# Patient Record
Sex: FEMALE | Race: BLACK OR AFRICAN AMERICAN | NOT HISPANIC OR LATINO
[De-identification: names, ages, dates, MRNs, and addresses within clinical notes are randomized per-mention and may not be internally consistent; named-entity substitution may affect disease eponyms.]

---

## 2017-03-16 ENCOUNTER — APPOINTMENT (OUTPATIENT)
Dept: SPEECH THERAPY | Facility: CLINIC | Age: 82
End: 2017-03-16

## 2017-03-16 ENCOUNTER — OUTPATIENT (OUTPATIENT)
Dept: OUTPATIENT SERVICES | Facility: HOSPITAL | Age: 82
LOS: 1 days | Discharge: HOME | End: 2017-03-16

## 2017-03-20 DIAGNOSIS — H90.3 SENSORINEURAL HEARING LOSS, BILATERAL: ICD-10-CM

## 2017-07-24 ENCOUNTER — APPOINTMENT (OUTPATIENT)
Dept: PHARMACY | Facility: CLINIC | Age: 82
End: 2017-07-24

## 2018-06-13 ENCOUNTER — OUTPATIENT (OUTPATIENT)
Dept: OUTPATIENT SERVICES | Facility: HOSPITAL | Age: 83
LOS: 1 days | Discharge: HOME | End: 2018-06-13

## 2018-06-13 ENCOUNTER — APPOINTMENT (OUTPATIENT)
Dept: SPEECH THERAPY | Facility: CLINIC | Age: 83
End: 2018-06-13

## 2018-06-14 DIAGNOSIS — H90.3 SENSORINEURAL HEARING LOSS, BILATERAL: ICD-10-CM

## 2018-10-25 ENCOUNTER — APPOINTMENT (OUTPATIENT)
Dept: SPEECH THERAPY | Facility: CLINIC | Age: 83
End: 2018-10-25

## 2019-05-23 ENCOUNTER — APPOINTMENT (OUTPATIENT)
Dept: SPEECH THERAPY | Facility: CLINIC | Age: 84
End: 2019-05-23

## 2019-05-23 ENCOUNTER — OUTPATIENT (OUTPATIENT)
Dept: OUTPATIENT SERVICES | Facility: HOSPITAL | Age: 84
LOS: 1 days | Discharge: HOME | End: 2019-05-23

## 2019-06-04 DIAGNOSIS — H90.3 SENSORINEURAL HEARING LOSS, BILATERAL: ICD-10-CM

## 2019-10-07 ENCOUNTER — EMERGENCY (EMERGENCY)
Facility: HOSPITAL | Age: 84
LOS: 1 days | Discharge: HOME | End: 2019-10-07
Attending: EMERGENCY MEDICINE
Payer: MEDICARE

## 2019-10-07 VITALS
SYSTOLIC BLOOD PRESSURE: 198 MMHG | RESPIRATION RATE: 20 BRPM | OXYGEN SATURATION: 96 % | WEIGHT: 123.02 LBS | HEIGHT: 62 IN | DIASTOLIC BLOOD PRESSURE: 69 MMHG | TEMPERATURE: 98 F | HEART RATE: 60 BPM

## 2019-10-07 PROCEDURE — 99283 EMERGENCY DEPT VISIT LOW MDM: CPT

## 2019-10-07 RX ORDER — MOXIFLOXACIN HCL 0.5 %
1 DROPS OPHTHALMIC (EYE)
Refills: 0 | Status: DISCONTINUED | OUTPATIENT
Start: 2019-10-07 | End: 2019-10-19

## 2019-10-07 RX ADMIN — Medication 1 DROP(S): at 22:47

## 2019-10-07 NOTE — ED PROVIDER NOTE - NSFOLLOWUPINSTRUCTIONS_ED_ALL_ED_FT
Use the moxifloxicin eye drops to the right eye every 8 hours for 1 week.  use the erythromycin ointment to the right eye 3 times a day.  follow up with [facility] eye clinic in 2-3 days.    return for worse pain, fever or other emergent concerns.

## 2019-10-07 NOTE — ED PROVIDER NOTE - PSH
Excision right axillary mass    "benign"  Laser surgery od  secondary  glaucoma   Left breast biopsy    "benign"  Left Cataract excision     (Normal Spontaneous Vaginal Delivery)  x3  Right cataract excision

## 2019-10-07 NOTE — ED PROVIDER NOTE - NSFOLLOWUPCLINICS_GEN_ALL_ED_FT
[facility]  Ophthalmology  [address] [place] [address] [address]  [place] [address]  Phone: [phone]  Fax:   Follow Up Time: 1-3 Days

## 2019-10-07 NOTE — ED PROVIDER NOTE - RAPID ASSESSMENT
87y F HTN, glaucoma, CHF, anemia, HLD, migraines here with c/o R eye pain since Friday described as irritation, given tylenol with some relief. Woke up Sat AM with some periorbital swelling and tearing. /94 at that time. spoke to Dr Rincon (renal) who advised to increased Amlodipine 5mg to 10mg which helped BP a bit. Seen by eye doctor today who sent pt to ER. Note states corneal defect and thinning, concern for perforation. Hx of glaucoma & trabs OU. CRVO 2 years ago. Rec consider Narinder flap vs copious  antibiotic and drops. NO fever or chills. No HA. No NV.   BP meds: HCTZ, metoprolol 25mg, enalapril 25mg, hydralazine 50mg, lasix 20mg  Meds: omeprazole, simvastatin, calcitriol, vit D, butalbital-caffeine-tylprn   Opthal- Dr Henry Gonzales   Retinal specialist-Dr Peña Glass  PCP-Dr Ayla Lamb 87y F HTN, glaucoma, CHF, anemia, HLD, migraines here with c/o R eye pain since Friday described as irritation, given tylenol with some relief. Woke up Sat AM with some periorbital swelling and tearing. /94 at that time. spoke to Dr Rincon (renal) who advised to increased Amlodipine 5mg to 10mg which helped BP a bit. Seen by eye doctor today who sent pt to ER. Note states corneal defect and thinning, concern for perforation. Hx of glaucoma & trabs OU. CRVO 2 years ago. Rec consider Narinder flap vs copious  antibiotic and drops. NO fever or chills. No HA. No NV. Optho consulted at 2056, awaiting call back.   BP meds: HCTZ, metoprolol 25mg, enalapril 25mg, hydralazine 50mg, lasix 20mg  Meds: omeprazole, simvastatin, calcitriol, vit D, butalbital-caffeine-tylprn   Opthal- Dr Henry Gonzales   Retinal specialist-Dr Peña Glass  PCP-Dr Ayla Lamb

## 2019-10-07 NOTE — ED PROVIDER NOTE - PATIENT PORTAL LINK FT
You can access the FollowMyHealth Patient Portal offered by [facility] by registering at the following website: [email or web address]. By joining [email or web address]’s FollowMyHealth portal, you will also be able to view your health information using other applications (apps) compatible with our system.

## 2019-10-07 NOTE — ED PROVIDER NOTE - PMH
Anemia    Chronic Renal Insufficiency    FH: Hypercholesterolemia    Gastric Ulcer  last endoscopy  3 years ago  Glaucoma    HTN - Hypertension    Osteoarthritis    Vitiligo  first noted 1979 etiology unknown

## 2019-10-07 NOTE — ED PROVIDER NOTE - OBJECTIVE STATEMENT
Attendinyo female presents with 3 days of pain to the right eye.  at baseline pt is blind in the right eye.  pt seen by eye doctor today and sent to the ED by ophtho evaluation for possible corneal abrasion.  pt seen by ophtho seen here prior to my evaluation.  no fever.  no chill.  no injury that she can recall.

## 2019-10-07 NOTE — ED ADULT NURSE REASSESSMENT NOTE - NS ED NURSE REASSESS COMMENT FT1
First dose moxifloxacin eye drops administered to R. eye per MD Mayen from opthalmology at bedside. Plan of care is being explained currently to patient and family member.

## 2019-10-07 NOTE — ED PROVIDER NOTE - CLINICAL SUMMARY MEDICAL DECISION MAKING FREE TEXT BOX
right corneal abrasion seen by ophtho.  they recommend moxifloxicin ophthalmic drops and erythromycin ointment and follow up with eye clinic within the week

## 2019-10-07 NOTE — CONSULT NOTE ADULT - SUBJECTIVE AND OBJECTIVE BOX
[facility] Ophthalmology Consult Note    HPI:  88YO F with Hx of Glaucoma OU, CRVO OD, Wet AMD OU coming in with R eye pain since saturday, with some redness OD and tearing. Patient went into ophthalmologist office  (Replaced by [facility]) who was concerned about corneal thinning and perforation and was sent to the ED. Patient states her eye pain is better than saturday. Denies any trauma, was at a birthday party on saturday and noticed the R eye pain. Patient has been NLP OD for 2.5 years, and poor vision in OS due to AMD for a long time as per daughter. Patient denies any vision changes OS. OD chronic blindness.     PMH: HTN, Pleural Effusion, Ruptured Hernia, Migranes, CHF, Anemia, HLP  Meds: As per ED provider note.   POcHx (including surgeries/lasers/trauma): Trab OU, Laser surgery for glaucoma OU, no trauma, Hx of Intravitreal injections OU, CRVO OD, Wet AMD OU    Drops: Combigan OU, Lopressor OU, Travatan OU  FamHx: None  Social Hx: None  Allergies: NKDA    ROS:  General (neg), Vision (per HPI), Head and Neck (neg), Pulm (neg), CV (neg), GI (neg),  (neg), Musculoskeletal (neg), Skin/Integ (neg), Neuro (neg), Endocrine (neg), Heme (neg), All/Immuno (neg)    Mood and Affect Appropriate ( x ),  Oriented to Time, Place, and Person x 3 ( x )    Ophthalmology Exam    Visual acuity (sc): NLP, 20/100 OS  Pupils: PERRL OD, no view OS due to hazy cornea   Ttono: 33, 18  Extraocular movements (EOMs): Full OU, no pain, no diplopia  Confrontational Visual Field (CVF):  Unable OD, Full OS  Color Plates: Unable OD 12/12 OS    Slit Lamp Exam (SLE)  External:  mild lid swelling, excessive lacrimation  Lids/Lashes/Lacrimal Ducts: Flat OU    Sclera/Conjunctiva:  1+ Injection OD, flat bleb OD, Superior functioning bleb OS  Cornea: OD with mm x mm size epithelial defect, with some thinning centrally about 25%, and 30% temporally. Oliver Negative OS    Anterior Chamber: Unable OD, D+Q OS  Iris:  Flat OD  Lens:  PCIOL OU    Fundus Exam: dilated with 1% tropicamide and 2.5% phenylephrine  Approval obtained from primary team for dilation  Patient aware that pupils can remained dilated for at least 4-6 hours  Exam performed with 20D lens    B scan OD   no vitritis,   Vitreous: wnl OU  Disc, cup/disc: sharp and pink, 0.4 OU  Macula:  wnl OU  Vessels:  wnl OU  Periphery: wnl OU    Diagnostic Testing:    Assessment:      Plan:      Follow-Up:  Patient should follow up his/her ophthalmologist or in the [facility] Practice within 1 week of discharge  [address] [place], [place] [id number] [phone]    S/D/W Dr Ortiz (attending) [facility] Ophthalmology Consult Note    HPI:  86YO F with Hx of Glaucoma OU, CRVO OD, Wet AMD OU coming in with R eye pain since saturday, with some redness OD and tearing. Patient went into ophthalmologist office  ([facility]) who was concerned about corneal thinning and perforation and was sent to the ED. Patient states her eye pain is better than saturday. Denies any trauma, was at a birthday party on saturday and noticed the R eye pain. Patient has been NLP OD for 2.5 years, and poor vision in OS due to AMD for a long time as per daughter. Patient denies any vision changes OS. OD chronic blindness.     PMH: HTN, Pleural Effusion, Ruptured Hernia, Migranes, CHF, Anemia, HLP  Meds: As per ED provider note.   POcHx (including surgeries/lasers/trauma): Trab OU, Laser surgery for glaucoma OU, no trauma, Hx of Intravitreal injections OU, CRVO OD, Wet AMD OU    Drops: Combigan OU, Lopressor OU, Travatan OU  FamHx: None  Social Hx: None  Allergies: NKDA    ROS:  General (neg), Vision (per HPI), Head and Neck (neg), Pulm (neg), CV (neg), GI (neg),  (neg), Musculoskeletal (neg), Skin/Integ (neg), Neuro (neg), Endocrine (neg), Heme (neg), All/Immuno (neg)    Mood and Affect Appropriate ( x ),  Oriented to Time, Place, and Person x 3 ( x )    Ophthalmology Exam    Visual acuity (sc): NLP, 20/100 OS  Pupils: PERRL OD, no view OS due to hazy cornea   Ttono: 33, 18  Extraocular movements (EOMs): Full OU, no pain, no diplopia  Confrontational Visual Field (CVF):  Unable OD, Full OS  Color Plates: Unable OD 12/12 OS    Slit Lamp Exam (SLE)  External:  mild lid swelling, excessive lacrimation  Lids/Lashes/Lacrimal Ducts: Flat OU    Sclera/Conjunctiva:  1+ Injection OD, flat bleb OD, Superior functioning bleb OS  Cornea: OD with ~6mm x 4mm size epithelial defect, with some thinning centrally about 20%, and 30% temporally. Oliver Negative OS    Anterior Chamber: Unable OD, D+Q OS  Iris:  Flat OD  Lens:  PCIOL OU    Fundus Exam: dilated with 1% tropicamide and 2.5% phenylephrine  Approval obtained from primary team for dilation  Patient aware that pupils can remained dilated for at least 4-6 hours  Exam performed with 20D lens    B scan OD   no vitritis    Vitreous: wnl OS  Disc, cup/disc: sharp and pink, 0.3 OS  Macula:  wnl OS  Vessels:  wnl OS  Periphery: wnl OS    Diagnostic Testing:        Assessment:  86YO F w PMH Glaucoma OD, CRVO OD, Wet AMD OU coming with R eye pain, from corneal abrasion.       Plan:  - Vigamox q2hrs   - Eointment qhs      Follow-Up:  Patient should follow up his/her ophthalmologist or in the [facility] Practice tomorrow. Patient will try to make an appointment with her private ophthalmologist tomorrow, if unable, will follow up at [address] [place] [address].  [place] [address] [phone]    D/W  [facility] Ophthalmology Consult Note    HPI:  86YO F with Hx of Glaucoma OU, CRVO OD, Wet AMD OU coming in with R eye pain since saturday, with some redness OD and tearing. Patient went into ophthalmologist office  ([facility]) who was concerned about corneal thinning and perforation and was sent to the ED. Patient states her eye pain is better than saturday. Denies any trauma, was at a birthday party on saturday and noticed the R eye pain. Patient has been NLP OD for 2.5 years, and poor vision in OS due to AMD for a long time as per daughter. Patient denies any vision changes OS. OD chronic blindness.     PMH: HTN, Pleural Effusion, Ruptured Hernia, Migranes, CHF, Anemia, HLP  Meds: As per ED provider note.   POcHx (including surgeries/lasers/trauma): Trab OU, Laser surgery for glaucoma OU, no trauma, Hx of Intravitreal injections OU, CRVO OD, Wet AMD OU    Drops: Combigan OU, Lopressor OU, Travatan OU  FamHx: None  Social Hx: None  Allergies: NKDA    ROS:  General (neg), Vision (per HPI), Head and Neck (neg), Pulm (neg), CV (neg), GI (neg),  (neg), Musculoskeletal (neg), Skin/Integ (neg), Neuro (neg), Endocrine (neg), Heme (neg), All/Immuno (neg)    Mood and Affect Appropriate ( x ),  Oriented to Time, Place, and Person x 3 ( x )    Ophthalmology Exam    Visual acuity (sc): NLP, 20/100 OS  Pupils: PERRL OD, no view OS due to hazy cornea   Ttono: 33, 18  Extraocular movements (EOMs): Full OU, no pain, no diplopia  Confrontational Visual Field (CVF):  Unable OD, Full OS  Color Plates: Unable OD 12/12 OS    Slit Lamp Exam (SLE)  External:  mild lid swelling, excessive lacrimation  Lids/Lashes/Lacrimal Ducts: Flat OU    Sclera/Conjunctiva:  1+ Injection OD, flat bleb OD, Superior functioning bleb OS  Cornea: OD with ~6mm x 4mm size epithelial defect, with some thinning centrally about 20%, and 30% temporally. Oliver Negative. OS Clear    Anterior Chamber: Unable OD, D+Q OS  Iris:  Flat OD  Lens:  PCIOL OU    Fundus Exam: dilated with 1% tropicamide and 2.5% phenylephrine  Approval obtained from primary team for dilation  Patient aware that pupils can remained dilated for at least 4-6 hours  Exam performed with 20D lens    B scan OD   no vitritis    Vitreous: wnl OS  Disc, cup/disc: sharp and pink, 0.3 OS  Macula:  wnl OS  Vessels:  wnl OS  Periphery: wnl OS    Diagnostic Testing:        Assessment:  86YO F w PMH Glaucoma OD, CRVO OD, Wet AMD OU coming with R eye pain, from corneal abrasion.       Plan:  - Vigamox q2hrs   - Eointment qhs      Follow-Up:  Patient should follow up his/her ophthalmologist or in the [facility] tomorrow. Patient will try to make an appointment with her private ophthalmologist tomorrow, if unable, will follow up at [address] [place] [address].  [place] [address] [phone]    D/W  [facility] Ophthalmology Consult Note    HPI:  88YO F with Hx of Glaucoma OU, CRVO OD, Wet AMD OU coming in with R eye pain since saturday, with some redness OD and tearing. Patient went into ophthalmologist office  ([facility]) who was concerned about corneal thinning and perforation and was sent to the ED. Patient states her eye pain is better than saturday. Denies any trauma, was at a birthday party on saturday and noticed the R eye pain. Patient has been NLP OD for 2.5 years, and poor vision in OS due to AMD for a long time as per daughter. Patient denies any vision changes OS. OD chronic blindness.     PMH: HTN, Pleural Effusion, Ruptured Hernia, Migranes, CHF, Anemia, HLP  Meds: As per ED provider note.   POcHx (including surgeries/lasers/trauma): Trab OU, Laser surgery for glaucoma OU, no trauma, Hx of Intravitreal injections OU, CRVO OD, Wet AMD OU    Drops: Combigan OU, Lopressor OU, Travatan OU  FamHx: None  Social Hx: None  Allergies: NKDA    ROS:  General (neg), Vision (per HPI), Head and Neck (neg), Pulm (neg), CV (neg), GI (neg),  (neg), Musculoskeletal (neg), Skin/Integ (neg), Neuro (neg), Endocrine (neg), Heme (neg), All/Immuno (neg)    Mood and Affect Appropriate ( x ),  Oriented to Time, Place, and Person x 3 ( x )    Ophthalmology Exam    Visual acuity (sc): NLP, 20/100 OS  Pupils: PERRL OD, no view OS due to hazy cornea   Ttono: 33, 18  Extraocular movements (EOMs): Full OU, no pain, no diplopia  Confrontational Visual Field (CVF):  Unable OD, Full OS  Color Plates: Unable OD 12/12 OS    Slit Lamp Exam (SLE)  External:  mild lid swelling, excessive lacrimation  Lids/Lashes/Lacrimal Ducts: Flat OU    Sclera/Conjunctiva:  1+ Injection OD, flat bleb OD, Superior functioning bleb OS  Cornea: OD with ~6mm x 4mm size epithelial defect, with some thinning centrally about 20%, and 30% temporally. Oliver Negative. OS Clear    Anterior Chamber: Unable OD, D+Q OS  Iris:  Flat OD  Lens:  PCIOL OU    Fundus Exam: dilated with 1% tropicamide and 2.5% phenylephrine  Approval obtained from primary team for dilation  Patient aware that pupils can remained dilated for at least 4-6 hours  Exam performed with 20D lens    B scan OD   no vitritis    Vitreous: wnl OS  Disc, cup/disc: 0.9 OS  Macula:  wnl OS  Vessels:  wnl OS  Periphery: wnl OS    Diagnostic Testing:        Assessment:  88YO F w PMH Glaucoma OD, CRVO OD, Wet AMD OU coming with R eye pain, from corneal abrasion.       Plan:  - Vigamox q2hrs   - Eointment qhs      Follow-Up:  Patient should follow up his/her ophthalmologist or in the [facility] Practice tomorrow. Patient will try to make an appointment with her private ophthalmologist tomorrow, if unable, will follow up at [address] [place] [address].  [place] [address] [phone]    D/W  [facility] Ophthalmology Consult Note    HPI:  88YO F with Hx of Glaucoma OU, CRVO OD, Wet AMD OU coming in with R eye pain since saturday, with some redness OD and tearing. Patient went into ophthalmologist office  ([facility]) who was concerned about corneal thinning and perforation and was sent to the ED. Patient states her eye pain is better than saturday. Denies any trauma, was at a birthday party on saturday and noticed the R eye pain. Patient has been NLP OD for 2.5 years, and poor vision in OS due to AMD for a long time as per daughter. Patient denies any vision changes OS. OD chronic blindness.     PMH: HTN, Pleural Effusion, Ruptured Hernia, Migranes, CHF, Anemia, HLP  Meds: As per ED provider note.   POcHx (including surgeries/lasers/trauma): Trab OU, Laser surgery for glaucoma OU, no trauma, Hx of Intravitreal injections OU, CRVO OD, Wet AMD OU    Drops: Combigan OU, Lopressor OU, Travatan OU  FamHx: None  Social Hx: None  Allergies: NKDA    ROS:  General (neg), Vision (per HPI), Head and Neck (neg), Pulm (neg), CV (neg), GI (neg),  (neg), Musculoskeletal (neg), Skin/Integ (neg), Neuro (neg), Endocrine (neg), Heme (neg), All/Immuno (neg)    Mood and Affect Appropriate ( x ),  Oriented to Time, Place, and Person x 3 ( x )    Ophthalmology Exam    Visual acuity (sc): NLP, 20/100 OS  Pupils: PERRL OD, no view OS due to hazy cornea   Ttono: 33, 18  Extraocular movements (EOMs): Full OU, no pain, no diplopia  Confrontational Visual Field (CVF):  Unable OD, Full OS  Color Plates: Unable OD 12/12 OS    Slit Lamp Exam (SLE)  External:  mild lid swelling, excessive lacrimation  Lids/Lashes/Lacrimal Ducts: Flat OU    Sclera/Conjunctiva:  1+ Injection OD, flat bleb OD, Superior functioning bleb OS  Cornea: OD with ~6mm x 4mm size epithelial defect, with some thinning centrally about 20%, and 30% temporally. Oliver Negative. OS Clear    Anterior Chamber: Unable OD, D+Q OS  Iris:  Flat OD  Lens:  PCIOL OU    Fundus Exam: dilated with 1% tropicamide and 2.5% phenylephrine  Approval obtained from primary team for dilation  Patient aware that pupils can remained dilated for at least 4-6 hours  Exam performed with 20D lens    B scan OD   no vitritis    Vitreous: wnl OS  Disc, cup/disc: 0.9 OS  Macula:  wnl OS  Vessels:  wnl OS  Periphery: wnl OS      Assessment:  88YO F w PMH Glaucoma OD, CRVO OD, Wet AMD OU coming with R eye pain, likely from corneal abrasion.       Plan:  - Vigamox q2hrs   - Eointment qhs  - c/w glaucoma drops  - education given to daughter and patient and questions answered      Follow-Up:  Patient should follow up his/her ophthalmologist or in the [facility] Practice tomorrow. Patient will try to make an appointment with her private ophthalmologist tomorrow, if unable, will follow up at [address] [place] [address].  [place] [address] [phone]    D/W

## 2019-10-08 ENCOUNTER — APPOINTMENT (OUTPATIENT)
Dept: OPHTHALMOLOGY | Facility: CLINIC | Age: 84
End: 2019-10-08

## 2019-10-08 VITALS
HEART RATE: 55 BPM | DIASTOLIC BLOOD PRESSURE: 75 MMHG | TEMPERATURE: 97 F | RESPIRATION RATE: 18 BRPM | OXYGEN SATURATION: 100 % | SYSTOLIC BLOOD PRESSURE: 180 MMHG

## 2019-10-08 PROCEDURE — 99284 EMERGENCY DEPT VISIT MOD MDM: CPT

## 2019-10-08 RX ORDER — MOXIFLOXACIN HCL 0.5 %
2 DROPS OPHTHALMIC (EYE) ONCE
Refills: 0 | Status: COMPLETED | OUTPATIENT
Start: 2019-10-08 | End: 2019-10-08

## 2019-10-08 RX ORDER — ERYTHROMYCIN BASE 5 MG/GRAM
1 OINTMENT (GRAM) OPHTHALMIC (EYE) ONCE
Refills: 0 | Status: COMPLETED | OUTPATIENT
Start: 2019-10-08 | End: 2019-10-08

## 2019-10-08 RX ADMIN — Medication 2 DROP(S): at 00:58

## 2019-10-08 RX ADMIN — Medication 1 APPLICATION(S): at 00:57

## 2019-10-08 NOTE — ED ADULT NURSE NOTE - OBJECTIVE STATEMENT
88 yo F 86 yo F amb 86 yo F ambulated to ED c/o red and imitated eye since friday, woke up saturday with periorbital swelling and pain.  Pt had HTN, and was advised by MD to increase medications. Pt was seen by eye doctor and advised to come to ED for evaluation.  Pt has right eye redness, tearing and swelling.  Pt has minimal visual acuity at baseline from 1 year ago as per daughter. Safety and comfort maintained.  will continue to monitor.

## 2021-06-23 ENCOUNTER — APPOINTMENT (OUTPATIENT)
Dept: SPEECH THERAPY | Facility: CLINIC | Age: 86
End: 2021-06-23

## 2021-06-23 ENCOUNTER — OUTPATIENT (OUTPATIENT)
Dept: OUTPATIENT SERVICES | Facility: HOSPITAL | Age: 86
LOS: 1 days | Discharge: HOME | End: 2021-06-23

## 2021-07-09 DIAGNOSIS — H90.3 SENSORINEURAL HEARING LOSS, BILATERAL: ICD-10-CM

## 2022-08-17 ENCOUNTER — OUTPATIENT (OUTPATIENT)
Dept: OUTPATIENT SERVICES | Facility: HOSPITAL | Age: 87
LOS: 1 days | Discharge: HOME | End: 2022-08-17

## 2022-08-17 ENCOUNTER — APPOINTMENT (OUTPATIENT)
Dept: SPEECH THERAPY | Facility: CLINIC | Age: 87
End: 2022-08-17

## 2022-08-22 DIAGNOSIS — H90.3 SENSORINEURAL HEARING LOSS, BILATERAL: ICD-10-CM

## 2022-08-31 ENCOUNTER — APPOINTMENT (OUTPATIENT)
Dept: SPEECH THERAPY | Facility: CLINIC | Age: 87
End: 2022-08-31

## 2022-09-01 ENCOUNTER — APPOINTMENT (OUTPATIENT)
Dept: SPEECH THERAPY | Facility: CLINIC | Age: 87
End: 2022-09-01

## 2022-11-21 ENCOUNTER — NON-APPOINTMENT (OUTPATIENT)
Age: 87
End: 2022-11-21

## 2023-04-19 ENCOUNTER — OFFICE (OUTPATIENT)
Dept: URBAN - METROPOLITAN AREA CLINIC 109 | Facility: CLINIC | Age: 88
Setting detail: OPHTHALMOLOGY
End: 2023-04-19
Payer: MEDICARE

## 2023-04-19 DIAGNOSIS — H40.1133: ICD-10-CM

## 2023-04-19 DIAGNOSIS — H18.11: ICD-10-CM

## 2023-04-19 PROCEDURE — 99213 OFFICE O/P EST LOW 20 MIN: CPT | Performed by: OPHTHALMOLOGY

## 2023-04-19 ASSESSMENT — SPHEQUIV_DERIVED
OD_SPHEQUIV: -2.375
OS_SPHEQUIV: -0.125
OS_SPHEQUIV: 3.125
OD_SPHEQUIV: 0.875

## 2023-04-19 ASSESSMENT — REFRACTION_MANIFEST
OS_AXIS: 068
OS_VA3: 20/300 (NI)
OD_AXIS: 048
OD_HPRISM_DIRECTION: PH
OS_AXIS: 068
OD_SPHERE: +1.75
OS_CYLINDER: -1.25
OD_AXIS: 048
OD_VA1: 20/50
OS_HPRISM_DIRECTION: PH
OS_SPHERE: +0.50
OS_SPHERE: +3.75
OD_SPHERE: -1.50
OD_VA3: 20/50 (NI)
OD_CYLINDER: -1.75
OS_VA2: 20/80
OD_CYLINDER: -1.75
OS_VA1: 20/300
OS_CYLINDER: -1.25
OD_VA2: 20/80

## 2023-04-19 ASSESSMENT — SUPERFICIAL PUNCTATE KERATITIS (SPK)
OD_SPK: 2+
OS_SPK: 2+

## 2023-04-19 ASSESSMENT — REFRACTION_CURRENTRX
OD_OVR_VA: 20/
OS_OVR_VA: 20/
OS_AXIS: 057
OS_CYLINDER: -1.25
OS_SPHERE: PL
OD_AXIS: 144
OS_CYLINDER: -1.25
OS_OVR_VA: 20/
OD_CYLINDER: -1.50
OD_SPHERE: +1.75
OD_OVR_VA: 20/
OS_SPHERE: PL
OS_AXIS: 053
OD_SPHERE: -2.00
OD_CYLINDER: -1.75
OD_AXIS: 143

## 2023-04-19 ASSESSMENT — VISUAL ACUITY
OS_BCVA: LP
OD_BCVA: CF 2FT

## 2023-04-19 ASSESSMENT — TONOMETRY
OD_IOP_MMHG: 14
OS_IOP_MMHG: 13

## 2023-04-19 ASSESSMENT — CONFRONTATIONAL VISUAL FIELD TEST (CVF)
OD_FINDINGS: FULL
OS_FINDINGS: FULL

## 2023-04-19 ASSESSMENT — CORNEAL DYSTROPHY - POSTERIOR
OS_POSTERIORDYSTROPHY: GUTTATA
OD_POSTERIORDYSTROPHY: GUTTATA

## 2023-04-19 ASSESSMENT — CORNEAL TRAUMA - ABRASION: OD_ABRASION: ABSENT

## 2023-08-07 ENCOUNTER — APPOINTMENT (OUTPATIENT)
Dept: SPEECH THERAPY | Facility: CLINIC | Age: 88
End: 2023-08-07

## 2023-08-07 ENCOUNTER — OUTPATIENT (OUTPATIENT)
Dept: OUTPATIENT SERVICES | Facility: HOSPITAL | Age: 88
LOS: 1 days | Discharge: HOME | End: 2023-08-07

## 2023-08-09 DIAGNOSIS — H90.3 SENSORINEURAL HEARING LOSS, BILATERAL: ICD-10-CM

## 2023-10-11 ENCOUNTER — OFFICE (OUTPATIENT)
Dept: URBAN - METROPOLITAN AREA CLINIC 109 | Facility: CLINIC | Age: 88
Setting detail: OPHTHALMOLOGY
End: 2023-10-11
Payer: MEDICARE

## 2023-10-11 DIAGNOSIS — H18.11: ICD-10-CM

## 2023-10-11 DIAGNOSIS — H40.1133: ICD-10-CM

## 2023-10-11 PROCEDURE — 99213 OFFICE O/P EST LOW 20 MIN: CPT | Performed by: OPHTHALMOLOGY

## 2023-10-11 ASSESSMENT — REFRACTION_CURRENTRX
OD_SPHERE: +1.75
OD_OVR_VA: 20/
OS_CYLINDER: -1.25
OD_OVR_VA: 20/
OS_AXIS: 057
OD_SPHERE: -2.00
OS_OVR_VA: 20/
OS_SPHERE: PL
OD_AXIS: 144
OD_CYLINDER: -1.50
OS_SPHERE: PL
OD_AXIS: 143
OS_OVR_VA: 20/
OS_CYLINDER: -1.25
OD_CYLINDER: -1.75
OS_AXIS: 053

## 2023-10-11 ASSESSMENT — REFRACTION_MANIFEST
OS_HPRISM_DIRECTION: PH
OD_VA3: 20/50 (NI)
OD_CYLINDER: -1.75
OD_HPRISM_DIRECTION: PH
OS_AXIS: 068
OS_VA3: 20/300 (NI)
OD_VA2: 20/80
OS_SPHERE: +0.50
OD_SPHERE: -1.50
OD_AXIS: 048
OD_CYLINDER: -1.75
OS_SPHERE: +3.75
OS_CYLINDER: -1.25
OS_VA1: 20/300
OD_VA1: 20/50
OD_SPHERE: +1.75
OS_VA2: 20/80
OS_CYLINDER: -1.25
OS_AXIS: 068
OD_AXIS: 048

## 2023-10-11 ASSESSMENT — SPHEQUIV_DERIVED
OS_SPHEQUIV: -0.125
OD_SPHEQUIV: 0.875
OD_SPHEQUIV: -2.375
OS_SPHEQUIV: 3.125

## 2023-10-11 ASSESSMENT — CORNEAL DYSTROPHY - POSTERIOR
OD_POSTERIORDYSTROPHY: GUTTATA
OS_POSTERIORDYSTROPHY: GUTTATA

## 2023-10-11 ASSESSMENT — VISUAL ACUITY
OS_BCVA: LP
OD_BCVA: CF 2FT

## 2023-10-11 ASSESSMENT — TONOMETRY
OS_IOP_MMHG: 12
OD_IOP_MMHG: 12

## 2023-10-11 ASSESSMENT — CORNEAL TRAUMA - ABRASION: OD_ABRASION: ABSENT

## 2023-10-11 ASSESSMENT — SUPERFICIAL PUNCTATE KERATITIS (SPK)
OD_SPK: 2+
OS_SPK: 2+

## 2023-10-11 ASSESSMENT — CONFRONTATIONAL VISUAL FIELD TEST (CVF)
OS_FINDINGS: FULL
OD_FINDINGS: FULL

## 2023-11-21 ENCOUNTER — NON-APPOINTMENT (OUTPATIENT)
Age: 88
End: 2023-11-21

## 2024-02-01 ENCOUNTER — NON-APPOINTMENT (OUTPATIENT)
Age: 89
End: 2024-02-01

## 2024-02-20 ENCOUNTER — APPOINTMENT (OUTPATIENT)
Dept: SPEECH THERAPY | Facility: CLINIC | Age: 89
End: 2024-02-20

## 2024-02-20 ENCOUNTER — OUTPATIENT (OUTPATIENT)
Dept: OUTPATIENT SERVICES | Facility: HOSPITAL | Age: 89
LOS: 1 days | Discharge: HOME | End: 2024-02-20

## 2024-02-23 ENCOUNTER — APPOINTMENT (OUTPATIENT)
Dept: PHARMACY | Facility: CLINIC | Age: 89
End: 2024-02-23

## 2024-03-11 NOTE — HISTORY OF PRESENT ILLNESS
[FreeTextEntry8] : Patient seen today for equipment troubleshooting. Typically seen by cochlear implant audiologist, Roro Terrell.  Daughter reports current equipment to be intermittent and to be flashing orange when on the head (has to have coil/cable in a certain position for steady connection). 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 Received cochlear implant for the right ear in 2010.

## 2024-03-11 NOTE — PLAN
[FreeTextEntry2] : 1) Continued otologic monitoring 2) Continued consistent use of cochlear implant 3) Routine annual mapping with Hemanth Marquez (informed next annual mapping would be August 2024)

## 2024-03-11 NOTE — ASSESSMENT
[FreeTextEntry1] : EQUIPMENT Right CI : Cochlear Internal:  Implant date: 2010 Surgeon: Dr. Linares Processor type(s): [id number] Serial numbers: [id number] Magnet strength/color: x1  MAPPING: Impedances: e12 and e21 short, e7, 9, 10, 17, 18 manually turned OFF (stable) ACE, MP1+2, 900Hz, Maxima 8, PW37 MAP56 generated automatically with emote assistant changes based off MAP55. No further changes. P1: #56- remote assistant changes based off previous MAP55  P2: #56- without SCAN P3: #56- Beam P4: #56- Zoom Volume/sensitivity: 6/12  Troubleshooting of patient's equipment revealed intermittent N7 coil/cable (steady sound when utilized with clinic cable). Created RMA for new N7 cable/coil to be sent to patient's home address.

## 2024-03-13 ENCOUNTER — OFFICE (OUTPATIENT)
Dept: URBAN - METROPOLITAN AREA CLINIC 109 | Facility: CLINIC | Age: 89
Setting detail: OPHTHALMOLOGY
End: 2024-03-13
Payer: MEDICARE

## 2024-03-13 DIAGNOSIS — H40.1133: ICD-10-CM

## 2024-03-13 DIAGNOSIS — H90.3 SENSORINEURAL HEARING LOSS, BILATERAL: ICD-10-CM

## 2024-03-13 DIAGNOSIS — H18.11: ICD-10-CM

## 2024-03-13 PROCEDURE — 99213 OFFICE O/P EST LOW 20 MIN: CPT | Performed by: OPHTHALMOLOGY

## 2024-03-13 ASSESSMENT — REFRACTION_CURRENTRX
OS_OVR_VA: 20/
OS_SPHERE: PL
OS_AXIS: 053
OS_AXIS: 057
OS_SPHERE: PL
OD_AXIS: 143
OD_CYLINDER: -1.75
OD_CYLINDER: -1.50
OD_SPHERE: -2.00
OS_OVR_VA: 20/
OD_SPHERE: +1.75
OD_OVR_VA: 20/
OD_AXIS: 144
OS_CYLINDER: -1.25
OD_OVR_VA: 20/
OS_CYLINDER: -1.25

## 2024-03-13 ASSESSMENT — REFRACTION_MANIFEST
OD_HPRISM_DIRECTION: PH
OD_SPHERE: -1.50
OS_AXIS: 068
OS_SPHERE: +3.75
OS_VA2: 20/80
OD_AXIS: 048
OS_SPHERE: +0.50
OD_SPHERE: +1.75
OD_CYLINDER: -1.75
OS_CYLINDER: -1.25
OS_HPRISM_DIRECTION: PH
OD_VA2: 20/80
OS_AXIS: 068
OS_VA1: 20/300
OS_VA3: 20/300 (NI)
OD_AXIS: 048
OD_VA1: 20/50
OD_CYLINDER: -1.75
OS_CYLINDER: -1.25
OD_VA3: 20/50 (NI)

## 2024-03-13 ASSESSMENT — SPHEQUIV_DERIVED
OS_SPHEQUIV: 3.125
OS_SPHEQUIV: -0.125
OD_SPHEQUIV: -2.375
OD_SPHEQUIV: 0.875

## 2024-09-11 ENCOUNTER — NON-APPOINTMENT (OUTPATIENT)
Age: 89
End: 2024-09-11

## 2024-10-04 ENCOUNTER — OFFICE (OUTPATIENT)
Dept: URBAN - METROPOLITAN AREA CLINIC 109 | Facility: CLINIC | Age: 89
Setting detail: OPHTHALMOLOGY
End: 2024-10-04
Payer: MEDICARE

## 2024-10-04 DIAGNOSIS — H40.1133: ICD-10-CM

## 2024-10-04 PROCEDURE — 99213 OFFICE O/P EST LOW 20 MIN: CPT | Performed by: OPHTHALMOLOGY

## 2024-10-04 ASSESSMENT — REFRACTION_MANIFEST
OS_HPRISM_DIRECTION: PH
OD_AXIS: 048
OD_VA3: 20/50 (NI)
OS_SPHERE: +3.75
OS_VA2: 20/80
OS_CYLINDER: -1.25
OD_AXIS: 048
OS_AXIS: 068
OS_CYLINDER: -1.25
OS_VA1: 20/300
OD_HPRISM_DIRECTION: PH
OD_SPHERE: -1.50
OD_SPHERE: +1.75
OD_VA1: 20/50
OD_CYLINDER: -1.75
OD_CYLINDER: -1.75
OD_VA2: 20/80
OS_VA3: 20/300 (NI)
OS_AXIS: 068
OS_SPHERE: +0.50

## 2024-10-04 ASSESSMENT — REFRACTION_CURRENTRX
OS_SPHERE: PL
OS_OVR_VA: 20/
OS_AXIS: 053
OD_SPHERE: +1.75
OD_OVR_VA: 20/
OS_AXIS: 057
OD_AXIS: 143
OD_AXIS: 144
OS_CYLINDER: -1.25
OS_OVR_VA: 20/
OS_SPHERE: PL
OD_CYLINDER: -1.50
OD_OVR_VA: 20/
OD_CYLINDER: -1.75
OS_CYLINDER: -1.25
OD_SPHERE: -2.00

## 2024-10-04 ASSESSMENT — VISUAL ACUITY
OD_BCVA: CF 2FT
OS_BCVA: LP

## 2024-10-04 ASSESSMENT — CORNEAL DYSTROPHY - POSTERIOR
OS_POSTERIORDYSTROPHY: GUTTATA
OD_POSTERIORDYSTROPHY: GUTTATA

## 2024-10-04 ASSESSMENT — TONOMETRY
OS_IOP_MMHG: 13
OD_IOP_MMHG: 12

## 2024-10-04 ASSESSMENT — CONFRONTATIONAL VISUAL FIELD TEST (CVF)
OD_FINDINGS: FULL
OS_FINDINGS: FULL

## 2024-10-04 ASSESSMENT — SUPERFICIAL PUNCTATE KERATITIS (SPK)
OS_SPK: 2+
OD_SPK: 2+

## 2024-10-04 ASSESSMENT — CORNEAL TRAUMA - ABRASION: OD_ABRASION: ABSENT

## 2024-11-01 ENCOUNTER — OFFICE (OUTPATIENT)
Dept: URBAN - METROPOLITAN AREA CLINIC 109 | Facility: CLINIC | Age: 89
Setting detail: OPHTHALMOLOGY
End: 2024-11-01
Payer: MEDICARE

## 2024-11-01 DIAGNOSIS — H40.1133: ICD-10-CM

## 2024-11-01 PROCEDURE — 99213 OFFICE O/P EST LOW 20 MIN: CPT | Performed by: OPHTHALMOLOGY

## 2024-11-01 ASSESSMENT — CONFRONTATIONAL VISUAL FIELD TEST (CVF)
OD_FINDINGS: FULL
OS_FINDINGS: FULL

## 2024-11-01 ASSESSMENT — CORNEAL DYSTROPHY - POSTERIOR
OD_POSTERIORDYSTROPHY: GUTTATA
OS_POSTERIORDYSTROPHY: GUTTATA

## 2024-11-01 ASSESSMENT — REFRACTION_MANIFEST
OD_HPRISM_DIRECTION: PH
OS_VA1: 20/300
OD_VA1: 20/50
OD_VA2: 20/80
OD_SPHERE: +1.75
OS_VA3: 20/300 (NI)
OS_AXIS: 068
OS_VA2: 20/80
OD_AXIS: 048
OD_CYLINDER: -1.75
OD_SPHERE: -1.50
OS_HPRISM_DIRECTION: PH
OS_SPHERE: +0.50
OS_AXIS: 068
OD_VA3: 20/50 (NI)
OS_CYLINDER: -1.25
OS_SPHERE: +3.75
OS_CYLINDER: -1.25
OD_CYLINDER: -1.75
OD_AXIS: 048

## 2024-11-01 ASSESSMENT — REFRACTION_CURRENTRX
OS_CYLINDER: -1.25
OD_OVR_VA: 20/
OS_OVR_VA: 20/
OD_CYLINDER: -1.50
OD_SPHERE: -2.00
OD_SPHERE: +1.75
OD_CYLINDER: -1.75
OD_AXIS: 144
OD_OVR_VA: 20/
OS_AXIS: 053
OS_OVR_VA: 20/
OD_AXIS: 143
OS_SPHERE: PL
OS_AXIS: 057
OS_SPHERE: PL
OS_CYLINDER: -1.25

## 2024-11-01 ASSESSMENT — SUPERFICIAL PUNCTATE KERATITIS (SPK)
OS_SPK: 2+
OD_SPK: 2+

## 2024-11-01 ASSESSMENT — VISUAL ACUITY
OD_BCVA: CF 2FT
OS_BCVA: LP

## 2024-11-01 ASSESSMENT — TONOMETRY
OD_IOP_MMHG: 13
OS_IOP_MMHG: 12

## 2024-11-01 ASSESSMENT — CORNEAL TRAUMA - ABRASION: OD_ABRASION: ABSENT

## 2025-03-21 ENCOUNTER — NON-APPOINTMENT (OUTPATIENT)
Age: 89
End: 2025-03-21

## 2025-07-09 NOTE — ED ADULT NURSE NOTE - NS ED NURSE DC PT EDUCATION RESOURCES
Thank you for allowing the [facility] to be a part of your care. Please pay close attention to the following medications as they have been changed during this visit.    1.) Please decrease your metoprolol to 25 mg one time daily.     2.) Please log daily Blood Pressure and heart rate  readings. Please check these vitals the same the same time every day.  
None needed